# Patient Record
(demographics unavailable — no encounter records)

---

## 2025-02-18 NOTE — PHYSICAL EXAM
[No Acute Distress] : no acute distress [Well-Appearing] : well-appearing [Normal Sclera/Conjunctiva] : normal sclera/conjunctiva [EOMI] : extraocular movements intact [Normal Outer Ear/Nose] : the outer ears and nose were normal in appearance [No Lymphadenopathy] : no lymphadenopathy [Supple] : supple [Thyroid Normal, No Nodules] : the thyroid was normal and there were no nodules present [No Respiratory Distress] : no respiratory distress  [No Accessory Muscle Use] : no accessory muscle use [Clear to Auscultation] : lungs were clear to auscultation bilaterally [No Edema] : there was no peripheral edema [No Extremity Clubbing/Cyanosis] : no extremity clubbing/cyanosis [Soft] : abdomen soft [Non Tender] : non-tender [Non-distended] : non-distended [Normal Bowel Sounds] : normal bowel sounds [Normal Affect] : the affect was normal [Normal Insight/Judgement] : insight and judgment were intact

## 2025-02-18 NOTE — HISTORY OF PRESENT ILLNESS
[FreeTextEntry1] : cpe  [de-identified] : DAINA RAMOS is a 23 year M who presents for CPE PMHx asthma, vitamin D deficiency Feels well overall

## 2025-02-18 NOTE — HEALTH RISK ASSESSMENT
[Yes] : Yes [2 - 4 times a month (2 pts)] : 2-4 times a month (2 points) [1 or 2 (0 pts)] : 1 or 2 (0 points) [Never (0 pts)] : Never (0 points) [No] : In the past 12 months have you used drugs other than those required for medical reasons? No [No falls in past year] : Patient reported no falls in the past year [0] : 2) Feeling down, depressed, or hopeless: Not at all (0) [PHQ-2 Negative - No further assessment needed] : PHQ-2 Negative - No further assessment needed [Never] : Never [HIV test declined] : HIV test declined [Hepatitis C test declined] : Hepatitis C test declined [With Family] : lives with family [Employed] : employed [Sexually Active] : sexually active [Fully functional (bathing, dressing, toileting, transferring, walking, feeding)] : Fully functional (bathing, dressing, toileting, transferring, walking, feeding) [Fully functional (using the telephone, shopping, preparing meals, housekeeping, doing laundry, using] : Fully functional and needs no help or supervision to perform IADLs (using the telephone, shopping, preparing meals, housekeeping, doing laundry, using transportation, managing medications and managing finances) [Feels Safe at Home] : Feels safe at home [Audit-CScore] : 2 [[id number]] : 0 [de-identified] : parents, sister [FreeTextEntry2] :  [FreeTextEntry3] : in a relationship  [de-identified] : 1 female partner, uses protection

## 2025-02-18 NOTE — ASSESSMENT
[FreeTextEntry1] : Health Care Maintenance - well visit - routine labs ordered - depression screen negative - flu vaccine- declines  - covid vaccines- reports 2 doses but declines  - tdap unsure when last received, advised to check records and obtain if 10+ years since last dose - advised to get annual eye exams with optometry/ophthalmology, skin exams with dermatology, and dental exams - RTC for CPE in 1 year or sooner prn  Asthma -c/w albuterol inhaler prn  Syncopal episode -patient with episode of syncope after having blood drawn in office, no fall, was sitting on exam table, and reclined to supine position by MA -asymptomatic after episode, no confusion, answering all questions appropriately  -likely vasovagal, states he did not eat or drink anything today but will refer to cardiology to rule out underlying heart disease -patient given snacks/juice, repeat VSS